# Patient Record
Sex: MALE
[De-identification: names, ages, dates, MRNs, and addresses within clinical notes are randomized per-mention and may not be internally consistent; named-entity substitution may affect disease eponyms.]

---

## 2018-03-25 NOTE — OPERATIVE REPORT
DATE OF PROCEDURE:  March 24, 2018 



REFERRING PHYSICIAN:  Dr. Michael Rivera.



PROCEDURE PERFORMED:  Colonoscopy and polypectomy. 



INDICATIONS FOR COLONOSCOPY:  Rectal bleeding.



MEDICATION:  Patient was done under MAC.  Please see anesthesiologist's 

note. 



PROCEDURE:  With the patient in left lateral decubitus position, the 

flexible fiberoptic Olympus colonoscope was inserted into the rectum with 

ease and advanced all the way to the cecum.  It was then withdrawn slowly.  

Mucosa overlying the cecum, ascending colon, and transverse colon appeared 

to be within normal limits.  One polyp was hot biopsied from the descending 

colon.  The sigmoid and rectum appeared to be within normal limits.  The 

scope was then retroflexed into the distal rectum and small internal 

hemorrhoids were noted, none of which was actively bleeding.  The scope was 

then straightened out.  It was subsequently withdrawn.  Some minimal 

diverticular disease was also noted in the left colon.  Patient tolerated 

the procedure well. 



IMPRESSION  

1. Descending colon polyp, hot biopsied. 

2. Diverticulosis, minimal. 

3. Internal hemorrhoids, none actively bleeding. 





PLAN:  Follow up histology.  Initiate high-fiber low-fat diet.  Initiate 

high-fiber supplement.  Start hydrocortisone suppositories b.i.d. x10 days 

then p.r.n.  Patient might benefit from a followup colonoscopy in 3 years.









DD:  03/24/2018 16:57

DT:  03/24/2018 17:30

Job#:  [id number]



cc:Michael Rivera DO

## 2022-07-30 ENCOUNTER — HOSPITAL ENCOUNTER (OUTPATIENT)
Dept: HOSPITAL 88 - OR | Age: 52
Discharge: HOME | End: 2022-07-30
Attending: INTERNAL MEDICINE
Payer: COMMERCIAL

## 2022-07-30 VITALS — SYSTOLIC BLOOD PRESSURE: 113 MMHG | DIASTOLIC BLOOD PRESSURE: 82 MMHG

## 2022-07-30 DIAGNOSIS — K64.8: ICD-10-CM

## 2022-07-30 DIAGNOSIS — Z86.010: ICD-10-CM

## 2022-07-30 DIAGNOSIS — K63.5: Primary | ICD-10-CM

## 2022-07-30 DIAGNOSIS — Z80.0: ICD-10-CM

## 2022-07-30 DIAGNOSIS — K57.30: ICD-10-CM

## 2022-07-30 DIAGNOSIS — E66.9: ICD-10-CM

## 2022-07-30 PROCEDURE — 36415 COLL VENOUS BLD VENIPUNCTURE: CPT

## 2022-07-30 PROCEDURE — 45380 COLONOSCOPY AND BIOPSY: CPT

## 2022-07-30 PROCEDURE — 45378 DIAGNOSTIC COLONOSCOPY: CPT

## 2022-07-30 PROCEDURE — 0223U NFCT DS 22 TRGT SARS-COV-2: CPT

## 2022-07-30 PROCEDURE — 45385 COLONOSCOPY W/LESION REMOVAL: CPT

## 2022-07-30 PROCEDURE — 93005 ELECTROCARDIOGRAM TRACING: CPT
